# Patient Record
Sex: MALE | Race: WHITE | NOT HISPANIC OR LATINO | Employment: FULL TIME | URBAN - METROPOLITAN AREA
[De-identification: names, ages, dates, MRNs, and addresses within clinical notes are randomized per-mention and may not be internally consistent; named-entity substitution may affect disease eponyms.]

---

## 2022-06-10 RX ORDER — CITALOPRAM 20 MG/1
20 TABLET ORAL DAILY
COMMUNITY

## 2022-06-13 ENCOUNTER — TELEPHONE (OUTPATIENT)
Dept: GASTROENTEROLOGY | Facility: CLINIC | Age: 42
End: 2022-06-13

## 2022-06-13 ENCOUNTER — OFFICE VISIT (OUTPATIENT)
Dept: GASTROENTEROLOGY | Facility: CLINIC | Age: 42
End: 2022-06-13
Payer: COMMERCIAL

## 2022-06-13 VITALS
SYSTOLIC BLOOD PRESSURE: 122 MMHG | WEIGHT: 276 LBS | HEIGHT: 76 IN | DIASTOLIC BLOOD PRESSURE: 80 MMHG | BODY MASS INDEX: 33.61 KG/M2 | HEART RATE: 83 BPM

## 2022-06-13 DIAGNOSIS — K62.5 RECTAL BLEEDING: Primary | ICD-10-CM

## 2022-06-13 PROCEDURE — 99243 OFF/OP CNSLTJ NEW/EST LOW 30: CPT | Performed by: NURSE PRACTITIONER

## 2022-06-13 RX ORDER — SODIUM PICOSULFATE, MAGNESIUM OXIDE, AND ANHYDROUS CITRIC ACID 10; 3.5; 12 MG/160ML; G/160ML; G/160ML
LIQUID ORAL
Qty: 320 ML | Refills: 0 | Status: SHIPPED | OUTPATIENT
Start: 2022-06-13

## 2022-06-13 NOTE — PROGRESS NOTES
[facility] - Outpatient Consultation  Terri Jenkins 38 y o  male MRN: [id number]  Encounter: [id number]    ASSESSMENT AND PLAN:      1  Rectal bleeding   Patient presents with 4-6 month history of intermittent rectal bleed with bright red blood noted on stool and with wiping after bowel movement  Bleeding increases with straining  Denies rectal pain or abdominal pain  No history of hemorrhoids or prolapsed rectal tissue  Suspect bleeding is hemorrhoidal   Anal fissure may also cause rectal bleeding but denies rectal pain  Recommend colonoscopy to rule out other etiology such as colon mass or large colon polyp  - scheduled for colonoscopy at [facility]  - continue daily Metamucil and encourage patient to increase daily fluid intake    Followup Appointment:  3 months  ______________________________________________________________________    Chief Complaint   Patient presents with    Blood in the stools for last 4-5 months     Referred by Dr Nacho Neff         HPI:   Terri Jenkins is a 38y o  year old male who presents with 4-6 month history of intermittent rectal bleeding  Patient has noticed bright red blood on stool which can occur several times per week and often occurs with straining to defecate  Has also noticed bright red blood with wiping after BM  He denies recent acute change in his bowel habits but does report decreased caliber of stools over the past several months  He recently started taking daily fiber supplement which has bulked up his bowel movement  Denies any rectal pain with BM or abdominal pain  He does ride a [employer] [address] but denies increase in bleeding with exercise bike use    Denies family history of colon cancer  No prior colonoscopy    Historical Information   Past Medical History:   Diagnosis Date    Allergic rhinitis     Anxiety     Asthma     Lipoma      History reviewed  No pertinent surgical history    Social History Substance and Sexual Activity   Alcohol Use Not Currently     Social History     Substance and Sexual Activity   Drug Use Not on file     Social History     Tobacco Use   Smoking Status Never Smoker   Smokeless Tobacco Never Used     Family History   Problem Relation Age of Onset    Hypertension Mother     Coronary artery disease Mother     Hypertension Father     Diabetes Maternal Grandmother     Coronary artery disease Maternal Grandfather     Colon cancer Neg Hx     Colon polyps Neg Hx        Meds/Allergies     Current Outpatient Medications:     Albuterol Sulfate 108 (90 Base) MCG/ACT AEPB    citalopram (CeleXA) 20 mg tablet    No Known Allergies    PHYSICAL EXAM:    Blood pressure 122/80, pulse 83, height 6' 4" (1 93 m), weight 125 kg (276 lb)  Body mass index is 33 6 kg/m²  General Appearance: NAD, cooperative, alert  Eyes: Anicteric  ENT:  Normocephalic, atraumatic, normal mucosa  Neck:  Supple, symmetrical, trachea midline,   Resp:  Clear to auscultation bilaterally; no rales, rhonchi or wheezing; respirations unlabored   CV:  S1 S2, Regular rate and rhythm; no murmur, rub, or gallop  GI:  Soft, non-tender, non-distended; normal bowel sounds; no masses, no organomegaly   Rectal: Deferred  Musculoskeletal: No cyanosis, clubbing or edema  Normal ROM  Skin:  No jaundice, rashes, or lesions   Psych: Normal affect, good eye contact  Neuro: No gross deficits, AAOx3        REVIEW OF SYSTEMS:    CONSTITUTIONAL: Denies any fever, chills, rigors, and weight loss  HEENT: No earache or tinnitus  Denies hearing loss or visual disturbances  CARDIOVASCULAR: No chest pain or palpitations  RESPIRATORY: Denies any cough, hemoptysis, shortness of breath or dyspnea on exertion  GASTROINTESTINAL: As noted in the History of Present Illness  GENITOURINARY: No problems with urination  Denies any hematuria or dysuria  NEUROLOGIC: No dizziness or vertigo, denies headaches     MUSCULOSKELETAL: Denies any muscle or joint pain  SKIN: Denies skin rashes or itching  ENDOCRINE: Denies excessive thirst  Denies intolerance to heat or cold  PSYCHOSOCIAL: Denies depression or anxiety  Denies any recent memory loss

## 2022-06-13 NOTE — TELEPHONE ENCOUNTER
Scheduled date of colonoscopy (as of today):7/28/22  Physician performing colonoscopy:Dr Huber Harp  Location of colonoscopy:[facility]  Bowel prep reviewed with patient:Clenpiq  Instructions reviewed with patient by: Vikash Rodrigues  Clearances: No

## 2022-07-18 ENCOUNTER — TELEPHONE (OUTPATIENT)
Dept: OTHER | Facility: OTHER | Age: 42
End: 2022-07-18

## 2022-07-18 NOTE — TELEPHONE ENCOUNTER
Pt is scheduled tomorrow at [facility], no time is given  Looks like he was already prepped   Sending to [place]

## 2022-07-18 NOTE — TELEPHONE ENCOUNTER
I called pt, no answer, left message that he was provided prep instructions at 6/13/22 office visit  [facility] will be calling him with time to report there  I also left message that he should have been on clears today after 9 am  I asked that he call back prior to 4:30 pm today

## 2022-07-18 NOTE — TELEPHONE ENCOUNTER
Patient called [facility], asked the office to call him back to confirm the date and time for his colonoscopy procedure  Patient stated that he was ordered Clenpiq for colonoscopy preparation, patient needs to be provided the instructions

## 2022-07-18 NOTE — TELEPHONE ENCOUNTER
Spoke with pt  Confirmed with him he is scheduled for tomorrow at [facility]  They should still be calling him this afternoon with an arrival time  Reviewed instructions with him again, pt has rx  He was confused as he thought his procedure was a different day  He is calling his wife to make sure she can still take off tomorrow and will call back if there are issues

## 2022-09-23 ENCOUNTER — OFFICE VISIT (OUTPATIENT)
Dept: GASTROENTEROLOGY | Facility: CLINIC | Age: 42
End: 2022-09-23
Payer: COMMERCIAL

## 2022-09-23 VITALS
BODY MASS INDEX: 34.34 KG/M2 | HEIGHT: 76 IN | WEIGHT: 282 LBS | SYSTOLIC BLOOD PRESSURE: 138 MMHG | DIASTOLIC BLOOD PRESSURE: 80 MMHG

## 2022-09-23 DIAGNOSIS — K62.89 PROCTITIS: Primary | ICD-10-CM

## 2022-09-23 PROCEDURE — 99213 OFFICE O/P EST LOW 20 MIN: CPT | Performed by: NURSE PRACTITIONER

## 2022-09-23 RX ORDER — MESALAMINE 1000 MG/1
1000 SUPPOSITORY RECTAL
Qty: 30 SUPPOSITORY | Refills: 2 | Status: SHIPPED | OUTPATIENT
Start: 2022-09-23

## 2022-09-23 NOTE — LETTER
September 23, 2022     Tim Quispe, [facility] [address]    Patient: Lion Singer   YOB: 1980   Date of Visit: 9/23/2022       Dear Dr Jacque Holt: Thank you for referring Lion Singer to me for evaluation  Below are my notes for this consultation  If you have questions, please do not hesitate to call me  I look forward to following your patient along with you  Sincerely,        SUKHDEV Champagne        CC: No Recipients  SUKHDEV Champagne  9/23/2022  8:40 AM  Sign when Signing Visit  [id number] - Outpatient Follow-up Note  Lion Singer 38 y o  male MRN: [id number]  Encounter: [id number]    ASSESSMENT AND PLAN:    1  Ulcerative proctitis  2  Rectal bleeding  Patient underwent colonoscopy 07/19/2022 for 6 month history of rectal bleeding  Found to have mild proctitis distal 5 cm  Biopsy showed mild to moderate chronic active colitis  Adenomatous polyp also excised  Treated with Canasa suppositories for approximately 3 weeks -rectal bleeding stopped after 1 week, no recurrent bleeding for 4-5 week  Episode of mucus and blood in bowel movement this morning  Denies change in bowel habits  - resume Canasa suppositories daily at HS x 14  Days  Recommend continuing if he has continued rectal bleeding  - continue daily bulk fiber and encourage patient to increase daily fluid intake    3  History of colon polyps  Colonoscopy 07/2022-adenomatous polyp excised  Recall recommended in 5 years/2027    Followup Appointment:  3 months  ______________________________________________________________________    Chief Complaint   Patient presents with    Follow-up     HPI: Presents in follow-up today for rectal bleeding  He underwent colonoscopy 07/19/2022 -noted to have mild distal proctitis, likely etiology of rectal bleeding  Rectal biopsy showed mild to moderate chronic active colitis   Adenomatous polyp excised  Canasa suppositories were prescribed after his procedure however he was not using on a regular basis  When called with biopsy results by Dr Yolis Vargas, patient began using rectal suppositories daily 8/8/22 for approximately 3 weeks with resolution of rectal bleeding       Denies rectal bleeding for past 5-6 weeks however this morning after having a small bowel movement he did notice mucus and small amount of blood in toilet bowl  Denies rectal or abdominal pain  No recent change in his bowel habits -has formed bowel movement 2-3 times per day, no diarrhea or tenesmus  Appetite has been good and his weight has been stable    Historical Information   Past Medical History:   Diagnosis Date    Allergic rhinitis     Anxiety     Asthma     Lipoma      No past surgical history on file  Social History     Substance and Sexual Activity   Alcohol Use Not Currently     Social History     Substance and Sexual Activity   Drug Use Not on file     Social History     Tobacco Use   Smoking Status Never Smoker   Smokeless Tobacco Never Used     Family History   Problem Relation Age of Onset    Hypertension Mother     Coronary artery disease Mother     Hypertension Father     Diabetes Maternal Grandmother     Coronary artery disease Maternal Grandfather     Colon cancer Neg Hx     Colon polyps Neg Hx          Current Outpatient Medications:     Albuterol Sulfate 108 (90 Base) MCG/ACT AEPB    citalopram (CeleXA) 20 mg tablet    Sod Picosulfate-Mag Ox-Cit Acd (Clenpiq) 10-3 5-12 MG-GM -GM/160ML SOLN  No Known Allergies  Reviewed medications and allergies and updated as indicated    PHYSICAL EXAM:    Blood pressure 138/80, height 6' 4" (1 93 m), weight 128 kg (282 lb)  Body mass index is 34 33 kg/m²  General Appearance: NAD, cooperative, alert  Eyes: Anicteric  ENT:  Normocephalic, atraumatic, normal mucosa      Neck:  Supple, symmetrical, trachea midline  Resp:  Clear to auscultation bilaterally; no rales, rhonchi or wheezing; respirations unlabored   CV:  S1 S2, Regular rate and rhythm; no murmur, rub, or gallop  GI:  Soft, non-tender, non-distended; normal bowel sounds; no masses, no organomegaly   Rectal: Deferred  Musculoskeletal: No cyanosis, clubbing or edema  Normal ROM    Skin:  No jaundice, rashes, or lesions   Psych: Normal affect, good eye contact  Neuro: No gross deficits, AAOx3

## 2022-09-23 NOTE — PROGRESS NOTES
[id number] - Outpatient Follow-up Note  Pita Stoll 38 y o  male MRN: [id number]  Encounter: [id number]    ASSESSMENT AND PLAN:    1  Ulcerative proctitis  2  Rectal bleeding  Patient underwent colonoscopy 07/19/2022 for 6 month history of rectal bleeding  Found to have mild proctitis distal 5 cm  Biopsy showed mild to moderate chronic active colitis  Adenomatous polyp also excised  Treated with Canasa suppositories for approximately 3 weeks -rectal bleeding stopped after 1 week, no recurrent bleeding for 4-5 week  Episode of mucus and blood in bowel movement this morning  Denies change in bowel habits  - resume Canasa suppositories daily at HS x 14  Days  Recommend continuing if he has continued rectal bleeding  - continue daily bulk fiber and encourage patient to increase daily fluid intake    3  History of colon polyps  Colonoscopy 07/2022-adenomatous polyp excised  Recall recommended in 5 years/2027    Followup Appointment:  3 months  ______________________________________________________________________    Chief Complaint   Patient presents with    Follow-up     HPI: Presents in follow-up today for rectal bleeding  He underwent colonoscopy 07/19/2022 -noted to have mild distal proctitis, likely etiology of rectal bleeding  Rectal biopsy showed mild to moderate chronic active colitis  Adenomatous polyp excised  Canasa suppositories were prescribed after his procedure however he was not using on a regular basis  When called with biopsy results by Dr Dalia Elam, patient began using rectal suppositories daily 8/8/22 for approximately 3 weeks with resolution of rectal bleeding       Denies rectal bleeding for past 5-6 weeks however this morning after having a small bowel movement he did notice mucus and small amount of blood in toilet bowl  Denies rectal or abdominal pain      No recent change in his bowel habits -has formed bowel movement 2-3 times per day, no diarrhea or tenesmus  Appetite has been good and his weight has been stable    Historical Information   Past Medical History:   Diagnosis Date    Allergic rhinitis     Anxiety     Asthma     Lipoma      No past surgical history on file  Social History     Substance and Sexual Activity   Alcohol Use Not Currently     Social History     Substance and Sexual Activity   Drug Use Not on file     Social History     Tobacco Use   Smoking Status Never Smoker   Smokeless Tobacco Never Used     Family History   Problem Relation Age of Onset    Hypertension Mother     Coronary artery disease Mother     Hypertension Father     Diabetes Maternal Grandmother     Coronary artery disease Maternal Grandfather     Colon cancer Neg Hx     Colon polyps Neg Hx          Current Outpatient Medications:     Albuterol Sulfate 108 (90 Base) MCG/ACT AEPB    citalopram (CeleXA) 20 mg tablet    Sod Picosulfate-Mag Ox-Cit Acd (Clenpiq) 10-3 5-12 MG-GM -GM/160ML SOLN  No Known Allergies  Reviewed medications and allergies and updated as indicated    PHYSICAL EXAM:    Blood pressure 138/80, height 6' 4" (1 93 m), weight 128 kg (282 lb)  Body mass index is 34 33 kg/m²  General Appearance: NAD, cooperative, alert  Eyes: Anicteric  ENT:  Normocephalic, atraumatic, normal mucosa  Neck:  Supple, symmetrical, trachea midline  Resp:  Clear to auscultation bilaterally; no rales, rhonchi or wheezing; respirations unlabored   CV:  S1 S2, Regular rate and rhythm; no murmur, rub, or gallop  GI:  Soft, non-tender, non-distended; normal bowel sounds; no masses, no organomegaly   Rectal: Deferred  Musculoskeletal: No cyanosis, clubbing or edema  Normal ROM    Skin:  No jaundice, rashes, or lesions   Psych: Normal affect, good eye contact  Neuro: No gross deficits, AAOx3

## 2023-01-10 ENCOUNTER — TELEPHONE (OUTPATIENT)
Dept: OTHER | Facility: OTHER | Age: 43
End: 2023-01-10

## 2023-01-10 NOTE — TELEPHONE ENCOUNTER
Spoke to patient-she has been feeling well with no further rectal bleeding since treated with mesalamine suppositories in September  This a m  he was straining with bowel movement and had small amount of blood noted in bowel movement and toilet brook[place]  Denies lower abdominal cramping/pain, no rectal pain  Recommend he continue to monitor for rectal bleeding and if it persists for several days we will consider further treatment    No signs of acute proctitis flare at present

## 2023-01-10 NOTE — TELEPHONE ENCOUNTER
Patient would like to speak to [place] about GI problem  He did not feel comfortable stating on phone what he wanted to speak about  He is aware he may hear back from clinical staff for further clarification

## 2025-06-09 NOTE — LETTER
June 13, 2022     Shilpa Sensor, [facility]  [address] [address]    Patient: Tosha Leos   YOB: 1980   Date of Visit: 6/13/2022       Dear Dr Shaun Ríos: Thank you for referring Tosha Leos to me for evaluation  Below are my notes for this consultation  If you have questions, please do not hesitate to call me  I look forward to following your patient along with you  Sincerely,        SUKHDEV Lewis        CC: No Recipients  SUKHDEV Lewis  6/13/2022  1:32 PM  Sign when Signing Visit    [facility] - Outpatient Consultation  Tosha Leos 38 y o  male MRN: [id number]  Encounter: [id number]    ASSESSMENT AND PLAN:      1  Rectal bleeding   Patient presents with 4-6 month history of intermittent rectal bleed with bright red blood noted on stool and with wiping after bowel movement  Bleeding increases with straining  Denies rectal pain or abdominal pain  No history of hemorrhoids or prolapsed rectal tissue  Suspect bleeding is hemorrhoidal   Anal fissure may also cause rectal bleeding but denies rectal pain  Recommend colonoscopy to rule out other etiology such as colon mass or large colon polyp  - scheduled for colonoscopy at [facility]  - continue daily Metamucil and encourage patient to increase daily fluid intake    Followup Appointment:  3 months  ______________________________________________________________________    Chief Complaint   Patient presents with    Blood in the stools for last 4-5 months     Referred by Dr Shaun Ríos         HPI:   Tosha Leos is a 38y o  year old male who presents with 4-6 month history of intermittent rectal bleeding  Patient has noticed bright red blood on stool which can occur several times per week and often occurs with straining to defecate  Has also noticed bright red blood with wiping after BM      He denies recent acute change in his bowel habits but does report decreased caliber of stools over the past several months  He recently started taking daily fiber supplement which has bulked up his bowel movement  Denies any rectal pain with BM or abdominal pain  He does ride a [address] but denies increase in bleeding with exercise bike use    Denies family history of colon cancer  No prior colonoscopy    Historical Information   Past Medical History:   Diagnosis Date    Allergic rhinitis     Anxiety     Asthma     Lipoma      History reviewed  No pertinent surgical history  Social History     Substance and Sexual Activity   Alcohol Use Not Currently     Social History     Substance and Sexual Activity   Drug Use Not on file     Social History     Tobacco Use   Smoking Status Never Smoker   Smokeless Tobacco Never Used     Family History   Problem Relation Age of Onset    Hypertension Mother     Coronary artery disease Mother     Hypertension Father     Diabetes Maternal Grandmother     Coronary artery disease Maternal Grandfather     Colon cancer Neg Hx     Colon polyps Neg Hx        Meds/Allergies     Current Outpatient Medications:     Albuterol Sulfate 108 (90 Base) MCG/ACT AEPB    citalopram (CeleXA) 20 mg tablet    No Known Allergies    PHYSICAL EXAM:    Blood pressure 122/80, pulse 83, height 6' 4" (1 93 m), weight 125 kg (276 lb)  Body mass index is 33 6 kg/m²  General Appearance: NAD, cooperative, alert  Eyes: Anicteric  ENT:  Normocephalic, atraumatic, normal mucosa  Neck:  Supple, symmetrical, trachea midline,   Resp:  Clear to auscultation bilaterally; no rales, rhonchi or wheezing; respirations unlabored   CV:  S1 S2, Regular rate and rhythm; no murmur, rub, or gallop  GI:  Soft, non-tender, non-distended; normal bowel sounds; no masses, no organomegaly   Rectal: Deferred  Musculoskeletal: No cyanosis, clubbing or edema  Normal ROM    Skin:  No jaundice, rashes, or lesions   Psych: Normal affect, good eye contact  Neuro: No gross deficits, AAOx3        REVIEW OF SYSTEMS:    CONSTITUTIONAL: Denies any fever, chills, rigors, and weight loss  HEENT: No earache or tinnitus  Denies hearing loss or visual disturbances  CARDIOVASCULAR: No chest pain or palpitations  RESPIRATORY: Denies any cough, hemoptysis, shortness of breath or dyspnea on exertion  GASTROINTESTINAL: As noted in the History of Present Illness  GENITOURINARY: No problems with urination  Denies any hematuria or dysuria  NEUROLOGIC: No dizziness or vertigo, denies headaches  MUSCULOSKELETAL: Denies any muscle or joint pain  SKIN: Denies skin rashes or itching  ENDOCRINE: Denies excessive thirst  Denies intolerance to heat or cold  PSYCHOSOCIAL: Denies depression or anxiety  Denies any recent memory loss  --- no